# Patient Record
Sex: FEMALE | Race: BLACK OR AFRICAN AMERICAN | Employment: OTHER | URBAN - METROPOLITAN AREA
[De-identification: names, ages, dates, MRNs, and addresses within clinical notes are randomized per-mention and may not be internally consistent; named-entity substitution may affect disease eponyms.]

---

## 2017-10-25 ENCOUNTER — HOSPITAL ENCOUNTER (OUTPATIENT)
Dept: PHYSICAL THERAPY | Age: 82
Discharge: HOME | End: 2017-10-25
Payer: MEDICARE

## 2017-10-25 PROCEDURE — 97162 PT EVAL MOD COMPLEX 30 MIN: CPT

## 2017-10-25 PROCEDURE — 97161 PT EVAL LOW COMPLEX 20 MIN: CPT

## 2017-10-25 PROCEDURE — 97530 THERAPEUTIC ACTIVITIES: CPT

## 2017-10-25 PROCEDURE — G8990 OTHER PT/OT CURRENT STATUS: HCPCS

## 2017-10-25 PROCEDURE — G8991 OTHER PT/OT GOAL STATUS: HCPCS

## 2017-10-25 PROCEDURE — 97140 MANUAL THERAPY 1/> REGIONS: CPT

## 2017-10-25 NOTE — PROGRESS NOTES
[address] [place] [address]      INITIAL EVALUATION    NAME: Sahara Gonzalez AGE: 81 y.o. GENDER: female  DATE: 10/25/2017  REFERRING PHYSICIAN: Antonella Angel MD; lalie Clark MD  HISTORY AND BACKGROUND:  This patient was referred to this clinic for evaluation and treatment of bilateral LE edema. Family history includes mother experiencing LE swelling per patient. Pt states she began experiencing appearance of dark areas in multiple places of her body, including lower extremites. Areas appear to be worsening in size and color, with entire R breast involved. Regarding LE swelling, pt reports L LE edema more severe that R LE, with open wounds and lymphorrhea present, being a chronic problem. Son accompanies patient to appt. Primary Diagnosis:  · BLE lymphedema, secondary (I89.0)  Other Treatment Diagnoses:   Abnormality of gait (other abnormalities of gait and mobility R26.89)   Swelling not relieved by elevation (R60.0 localized edema)   Malignant neoplasm of breast 2010 per pt report   Open wound L LE S81.801S  Obesity (E66.01)  Date of Onset: 5/19/2017 referral sent to clinic. Pt is a poor historian, unable to identify time of onset of LE edema. Present Symptoms and Functional Limitations: bilateral LE edema  Lymphedema Life Impact Scale: 5/72; 7%; CI  Past Medical History:   Past Medical History:   Diagnosis Date    Cancer ([facility] [address]     R breast    Ill-defined condition     LE swelling with open wounds, L>R     Ill-defined condition     Atopic dermatitis    Ill-defined condition     Pruritis    Ill-defined condition     Post Inflammatory Hyperpigmentation   No past surgical history on file. Current Medications:    No current outpatient prescriptions on file. No current facility-administered medications for this encounter. Allergies:  Allergies not on file   Prior Level of Function/Social/Work History/Personal factors and/or comorbidities impacting plan of care: Pt lives at home alone. Son accompanies patient to appt today, however, does not assist patient at home, with no assistance at home provided per pt report. Pt seems unaware of functional limitations, including abnormality of gait. Pt reports L ankle/lower leg swelling with intermittent open wounds for \"some time\", unable to provide time frame. Reports \"sores\" all over her body presented, first on arms possibly, and have progressed. Trunk/arms/legs/R breast significant involvement noted with hyperpigmentation. Pt retired. History of breast cancer, R, which is breast that is discolored, with pt reports surgeon being Dr. Lakshmi Jiménez. Living Situation: lives alone, no assistance at home. Trainable Caregiver?: no assistance at home. Son accompanies patient to appt. Self-care/ADLs: mod I, additional time. Pt states she bathes \"sitting on the side of the tub. \"  Pt reports she does this without help. Pt states she does have a tub seat, with therapist encouraging patient to use tub seat vs sitting on edge of tub to reduce risk of injury. Pt reports she prefers sitting on edge of tub. Mobility: transfers on/off mat supine to sit mod I, additional time. Sit to stand transfers mod I, additional time. Gait with straight cane in cervical flexion posture, pt looking at floor, and unable to perform cervical extension in standing. Pt able to return neck to neutral position in sitting. Pt demonstrates wide base of support, absent heel strike, and decreased stride length bilateral LE. Rollator walker would be more appropriate for pt vs straight cane, to reduce fall risk. Sleeping Arrangement:  Bed. Adaptive Equipment Owned: Cane, shower seat.   Previous Therapy:  None to address LE swelling  Compression/Lymphedema Equipment:  none    SUBJECTIVE:   Pt arrives with son to appt, with primary concern being hyperpigmented areas bilateral UE/LE/trunk including R breast.  Pt believes this is the reason for her appt at this clinic. Pt does report edema bilateral LE, L>R, with intermittent open wounds with \"leaking fluid\" noted when open wounds are present. Pt identifies open areas L ankle/lower leg, showing them to therapist.  Pt unable to identify onset of edema. Does report family history of LE swelling, including mother, who passed away 4 years ago. Patients goals for therapy: pt's primary concern voiced is hyperpigmented spots--see photos below. Pt does states she would like to heal open wounds L LE, and prevent further open areas in her leg. OBJECTIVE DATA SUMMARY:   EXAMINATION/PRESENTATION/DECISION MAKING:   Pain:  Pain Scale 1: Numeric (0 - 10)  Pain Intensity 1: 2  Pain Location 1: Leg  Self-Care and ADLs:  Grooming: not assessed Bathing: not assessed   UB Dressing: not assessed LB Dressing: Pt dons/doffs pants in clinic, mod I, additional time. Don/Doff Shoes/Socks: able to apply slip on shoes indep Toileting: not assessed   Other: Not assessed    Skin and Tissue Assessment:  Dermal Status:  ( )  Intact (x )  Dry   (x )  Tenuous ( x)  Flaky   (x )  Wound/lesion present:  L LE, see photo below. 5 mm x 10 mm medial distal LE. ( x)  Scars: sclerotic skin texture lower legs, with raising scarring appearance noted. See photos below. (x )  Dermatitis:  See photos below regarding hyperpigmented areas, with pictures taking of exposed skin at appt today. Arms/legs/trunk/R breast involved. Texture/Consistency:  (x )  Boggy (x )  Pitting Edema: +2 medial malleolar region   ( )  Brawny ( )  Combination   (x )  Fibrotic/Woody:  Bilateral lower legs, skin/tissue texture. Pigmentation/Color Change:  ( )  Normal ( )  Hemosiderin   ( )  Red ( )  Erythematous   (x )  Hyperpigmented:  As noted below per photos.  ( )  Hyperlipodermatosclerosis   Anomalies:  (x )  Lymphorrhea: mild L LE, open wound as noted above. ( )  Vesicles   ( )  Petechiae ( )  Warty Vercusis   ( )  Bullae ( )  Papilloma   Circulatory:  ( )  REHAN ( )  Varicosities:   (x )  Pulses: located bilateral dorsal pedal/posterior tibialis pulses with doppler. ( )  Vascular studies ruled out DVT in past   ( )  DVT History    Nails:  ( )  Normal  (x )  Fungus  Stemmers Sign: mild positive L; negative R      Bilateral LE/feet      L LE, open wounds, medial aspect   L LE, thigh, lateral aspect    R LE lateral aspect    L LE lateral aspect      Height:  Height: 5' 3\" (160 cm)  Weight:  Weight: 85.3 kg (188 lb)   BMI:  BMI (calculated): 33.3  (36 or greater: adversely affecting lymphedema)  Volumetric Measurements:   Right:  9946. 31 mL Left:  10,337.03 mL   % Difference: 4.33% L LE>R LE   (See scanned graph)  Range of Motion: bilateral LE/UE grossly WFL. Pt unable to perform cervical extension in standing posture, cervical extension to neutral in sitting position. Strength: bilateral LE 4/5 with MMT. Sensation:  Intact to light touch bilateral foot/leg. Mobility:  Bed/Chair Mobility:  Mod I, additional time. Transfers: Mod I, straight cane, additional time. Sitting Balance:  good Standing Balance:  Fair-   Gait:  With straight cane, cervical spine in flexion, with line of sight to floor. Wide base of support, decreased stride length bilateral LE, and absent heel strike noted. Pt would benefit from rollator walker to normalize gait pattern and reduce risk of falls, with Dr. Scott Acosta office notified by therapist, and patient instructed by therapist to call for an appt to see PCP to assist in process of obtaining rollator walker. Wheelchair Mobility:  na   Endurance:  Pt ambulates from building to car with straight cane, 125 feet, stopping 5-7 times for rest breaks in standing, slow selam noted. Stairs:  Not assessed. Safety:  Patient is alert and oriented:  x4   Safety awareness:  Poor.   Pt unaware of limitations regarding mobility/gait/high fall risk. Pt does report 1 falls 3-4 months ago, at home, stating she feel asleep in her cair and fell into the floor. States she was unable to get up without assistance. Fall Risk?:  High. Rollator walker recommended to pt and to Dr. Torres Challenger assistant, to aid in reducing risk of falls, assist in normalizing gait pattern, and allowing patient to take seated breaks as needed due to compromised endurance. Patient given written fall prevention handout: Yes   Precautions:  Standard lymphedema precautions to include avoiding blood pressure readings, injections and IVs or other procedures/acts that could lead to broken skin on affected area, and avoiding excessive heat, resistive activity or altitude without compression garment    Functional Measure:   LLIS  In compliance with CMSs Claims Based Outcome Reporting, the following G-code set was chosen for this patient based on their primary functional limitation being treated: The outcome measure chosen to determine the severity of the functional limitation was the LLIS with a score of 5/72 which was correlated with the impairment scale. ?  Other PT/OT Primary Functional Limitations:     - CURRENT STATUS: CI - 1%-19% impaired, limited or restricted    - GOAL STATUS: CH - 0% impaired, limited or restricted    - D/C STATUS:  ---------------To be determined---------------       Physical Therapy Evaluation Charge Determination   History Examination Presentation Decision-Making   MEDIUM  Complexity : 1-2 comorbidities / personal factors will impact the outcome/ POC  MEDIUM Complexity : 3 Standardized tests and measures addressing body structure, function, activity limitation and / or participation in recreation  MEDIUM Complexity : Evolving with changing characteristics  Other outcome measures LLIS  LOW       Based on the above components, the patient evaluation is determined to be of the following complexity level: LOW Evaluation Time: 11:48-12:10 22 minutes    TREATMENT PROVIDED:   1. Treatment description:  The patient/son were educated regarding the role and function of the lymphatic system, and instructed in the lymphedema management protocol of complete decongestive therapy (CDT). This includes skin care to prevent breakdown or infection, lymphedema exercises, custom compression therapy options (bandaging, compression garments, compression pump, Navas Young, [employer]Pak, [employer], etc. as needed), and decongestion with manual lymphatic drainage as indicated. We discussed the need for consistent compression system for lymphedema management. Skin care education was performed,applying low pH lotion to extremity using upward strokes to stimulate lymphatic vessels. Due to distance patient lives from clinic, and edema being mild in nature, open wounds L LE, decision made to order Juzo foot/calf compression wrap garments for L LE, with measurements completed. Pt advised to wash any breaks in skin immediately to reduce risk of infection, applying wound covering if drainage present. Pt verbalizes understanding of instruction provided, son present for evaluation and education. Pt and son were advised that patient may require assistance for donning compression garments, with pt insisting she will be able to don/doff garment. Pt advised that therapist will contact Dr. Shakila Shaffer regarding need for rollator walker for patient, as well as Dr. Tremaine Munoz regarding follow up appt to have R breast assessed due to hyperpigmentation. Both offices were contacted. Assistant at Dr. Brenden Puckett office states she will call patient to schedule an appt. Treatment time:  12:11-1:15 pm  Minutes: 64 minutes      ASSESSMENT:   Johnny Rodriguez is a 81 y.o. female who presents with stage II lymphedema, mixed with dermatologic changes which appear to be contributing to hardening of skin/tissue bilateral lower legs.   Note open wound L LE as documented above, with decision made to proceed with ordering Juzo compression wrap foot/calf pieces for L LE, to address open wounds/lymphedema. Pt currently presents with more concerns regarding hyperpigmented skin changes, which she states are \"spreading\", and involved extremities, trunk including R breast.  Pt also presents with functional mobility limitations, with PCP's office called regarding patient would benefit from rollator walker to reduce risk of falls. Dr. Lul Su office also notified of hyperpigmentation R breast, with therapist reporting recommendation that patient follow up with Dr. Rafy Arce for evaluation of R breast due to skin changes, with pt stating skin changes to breast were not present at time of last mammogram 1 year ago. Patient will benefit from complete decongestive therapy (CDT) including manual lymphatic drainage (MLD) technique, short-stretch textile bandages/compression system to decongest limb, and kinesiotaping as appropriate. Patient will receive instruction in proper skin care to recognize signs/symptoms of and prevent infection, therapeutic exercise, and self-MLD for independent home program and restorative lymphatic performance. This care is medically necessary due to the infection risk with lymphedema, and to improve functional activities. CDT is necessary to resolve swelling to allow patient to return to wearing normal clothes/footwear, and prevent worsening of symptoms, such as venous stasis ulcerations, infections, or hospitalizations. Patient will be independent with home program strategies to allow improved ADL ability and mobility and to allow patient to return to greatest functional independence. Rehabilitation potential is considered to be Good regarding LE edema management if appropriate support offered to patient as needed with compression garment management.   Factors which may influence rehabilitation potential include limited assistance at home, however, son present with patient for evaluation. Pt has other health concerns which may limit her concern to effectively manage LE lymphedema/edema. Distance patient lives from clinic may limited compliance with recommended program.  Patient will benefit from 4-8 physical therapy visits over 10 weeks to optimize improvement in these areas. PLAN OF CARE:   Recommendations and Planned Interventions:  Manual lymph drainage/complete decongestive therapy  Compression garment fitting/provision  Lymphedema therapeutic exercise  Self-care training  Functional mobility training  Education in skin care and lymphedema precautions  Caregiver education as needed  Wound care as needed     GOALS    Time frame: 6-10 weeks  1. Instruct the patient to be independent with proper skin care to prevent future skin breakdown and decrease the potential risk for infections that are associated with Lymphedema. 2. Patient will be independent with a personal lymphedema exercise program to assist with the lymphatic flow and reduce limb volume L LE by 100-200 mL. 3. Patient will understand the signs and symptoms of acute infection. 4.   Patient will have knowledge of the compression options and acquire a safe and   appropriate daytime and night time compression system to prevent    re-accumulation of fluid. 5.  Patient or family will be able to don/doff garments independently. Garment   system effectiveness will be evaluated prior to discharge for better long-term   management and outcomes. 6. LLIS improved by 2-4 points after transition to compression garment daily wear. 7.  Open wound healed 100% L lower leg by time of discharge. 8.   To transition patient to independent restorative phase of CDT. Patient has participated in goal setting and agrees to work toward plan of care. Patient was instructed to call if questions or concerns arise.     Thank you for this referral.  Valarie Chen, PT, CLT   Time Calculation: 87 mins    TREATMENT PLAN EFFECTIVE DATES:   10/25/2017 TO 1/10/2018  I have read the above plan of care for [facility]. I certify the above prescribed services are required by this patient and are medically necessary.   The above plan of care has been developed in conjunction with the lymphedema/physical therapist.       Physician Signature: ____________________________________Date:______________          CC: Dr. Nikky Blakely

## 2017-10-26 VITALS
DIASTOLIC BLOOD PRESSURE: 88 MMHG | WEIGHT: 188 LBS | SYSTOLIC BLOOD PRESSURE: 160 MMHG | OXYGEN SATURATION: 98 % | HEART RATE: 85 BPM | BODY MASS INDEX: 33.31 KG/M2 | HEIGHT: 63 IN